# Patient Record
(demographics unavailable — no encounter records)

---

## 2017-03-22 NOTE — ER DOCUMENT REPORT
ED Medical Screen (RME)





- General


Stated Complaint: STOMACH PAIN


Notes: 


22 yo male c/o fever, headache x 1 day.  epigastric pain last pm, vomited x 2.  





no chronic illness


TRAVEL OUTSIDE OF THE [place] IN LAST 30 DAYS: No





- Related Data


Allergies/Adverse Reactions: 


 





No Known Allergies Allergy (Unverified 05/10/14 11:30)


 











Past Medical History


Pulmonary Medical History: Reports: Hx Asthma





- Immunizations


Hx Diphtheria, Pertussis, Tetanus Vaccination: Yes





Physical Exam





- Vital signs


Vitals: 





 











Temp Pulse Resp BP Pulse Ox


 


 101.1 F H  126 H  18   134/71 H  100 


 


 03/22/17 17:12  03/22/17 17:12  03/22/17 17:12  03/22/17 17:12  03/22/17 17:12














Course





- Vital Signs


Vital signs: 





 











Temp Pulse Resp BP Pulse Ox


 


 101.1 F H  126 H  18   134/71 H  100 


 


 03/22/17 17:12  03/22/17 17:12  03/22/17 17:12  03/22/17 17:12  03/22/17 17:12

## 2017-03-22 NOTE — ER DOCUMENT REPORT
ED General





- General


Chief Complaint: Headache


Stated Complaint: STOMACH PAIN


Time seen by provider: 20:35


Notes: 


Patient is a 23-year-old male that comes emergency department for chief 

complaint of vomiting that began last night and a fever that developed today.  

He states that he was having some upper abdominal pain after vomiting currently 

denies any symptoms.  He states he had a headache but this resolved with 

Tylenol.  He denies shortness of breath, chest pain, flank pain. He denies any 

current symptoms.  He denies any surgeries, daily medications, or any other 

medical history.





TRAVEL OUTSIDE OF THE [place] IN LAST 30 DAYS: No





- Related Data


Allergies/Adverse Reactions: 


 





No Known Allergies Allergy (Unverified 05/10/14 11:30)


 











Past Medical History





- General


Information source: Patient





- Social History


Smoking Status: Never Smoker


Frequency of alcohol use: None


Drug Abuse: None


Lives with: Family


Family History: Reviewed & Not Pertinent


Patient has suicidal ideation: No


Patient has homicidal ideation: No


Pulmonary Medical History: Reports: Hx Asthma


Renal/ Medical History: Denies: Hx Peritoneal Dialysis


Surgical Hx: Negative





- Immunizations


Hx Diphtheria, Pertussis, Tetanus Vaccination: Yes





Review of Systems





- Review of Systems


Constitutional: See HPI


EENT: No symptoms reported


Cardiovascular: No symptoms reported


Respiratory: No symptoms reported


Gastrointestinal: See HPI


Genitourinary: No symptoms reported


Male Genitourinary: No symptoms reported


Musculoskeletal: No symptoms reported


Skin: No symptoms reported


Hematologic/Lymphatic: No symptoms reported


Neurological/Psychological: See HPI





Physical Exam





- Vital signs


Vitals: 


 











Temp Pulse Resp BP Pulse Ox


 


 101.1 F H  126 H  18   134/71 H  100 


 


 03/22/17 17:12  03/22/17 17:12  03/22/17 17:12  03/22/17 17:12  03/22/17 17:12











Interpretation: Normal





- General


General appearance: Appears well, Alert


In distress: None





- HEENT


Head: Normocephalic, Atraumatic


Eyes: Normal


Conjunctiva: Normal


Extraocular movements intact: Yes


Eyelashes: Normal


Pupils: PERRL


Ears: Normal


External canal: Normal


Tympanic membrane: Normal


Sinus: Normal


Nasal: Normal


Mouth/Lips: Normal


Mucous membranes: Normal - Not significantly dry


Pharynx: Normal


Neck: Normal.  No: Anterior cervical chain, Posterior cervical chain, 

Meningismus





- Respiratory


Respiratory status: No respiratory distress


Chest status: Nontender


Breath sounds: Normal


Chest palpation: Normal





- Cardiovascular


Rhythm: Regular.  No: Tachycardia


Heart sounds: Normal auscultation, S1 appreciated, S2 appreciated


Murmur: No





- Abdominal


Inspection: Normal


Distension: No distension


Bowel sounds: Normal


Tenderness: Nontender


Organomegaly: No organomegaly





- Back


Back: Normal, Nontender





- Extremities


General upper extremity: Normal inspection, Nontender, Normal color, Normal ROM

, Normal temperature


General lower extremity: Normal inspection, Nontender, Normal color, Normal ROM

, Normal temperature, Normal weight bearing.  No: Juvencio's sign





- Neurological


Neuro grossly intact: Yes


Cognition: Normal


Orientation: AAOx4


[place] Coma Scale Eye Opening: Spontaneous


Red Coma Scale Verbal: Oriented


[place] Coma Scale Motor: Obeys Commands


Red Coma Scale Total: 15


Speech: Normal


Motor strength normal: LUE, RUE, LLE, RLE


Sensory: Normal





- Psychological


Associated symptoms: Normal affect, Normal mood





- Skin


Skin Temperature: Warm


Skin Moisture: Dry


Skin Color: Normal





Course





- Re-evaluation


Re-evalutation: 


Patient is very well-appearing.  Fever and tachycardia resolved after 

treatments from triage.  Completely soft and benign abdomen, no nuchal rigidity

, clear lungs, ENT exam is unremarkable.  Mild leukocytosis, suspect this was 

from vomiting, dehydration on workup but patient is tolerating fluids by mouth 

without any difficulty.  Providing the nausea medication, discussed primary 

care follow-up, discussed return precautions, patient states understanding and 

agreement





- Vital Signs


Vital signs: 


 











Temp Pulse Resp BP Pulse Ox


 


 98.6 F   97   18   119/61   96 


 


 03/22/17 20:56  03/22/17 20:56  03/22/17 20:56  03/22/17 20:56  03/22/17 20:56














- Laboratory


Result Diagrams: 


 03/22/17 17:25





 03/22/17 17:25


Laboratory results interpreted by me: 


 











  03/22/17 03/22/17 03/22/17





  17:25 17:25 17:25


 


WBC  11.8 H  


 


Seg Neutrophils %  87.5 H  


 


Lymphocytes %  6.7 L  


 


Absolute Neutrophils  10.3 H  


 


BUN   21 H 


 


Urine Protein    30 H














Discharge





- Discharge


Clinical Impression: 


Nausea and vomiting


Qualifiers:


 Vomiting type: unspecified Vomiting Intractability: non-intractable Qualified 

Code(s): R11.2 - Nausea with vomiting, unspecified





Fever


Qualifiers:


 Fever type: unspecified Qualified Code(s): R50.9 - Fever, unspecified





Condition: Stable


Disposition: HOME, SELF-CARE


Additional Instructions: 


Symptoms, examination, and workup are most consistent with a viral syndrome.  

This should resolve on its own.


Take Tylenol or ibuprofen for fever, drink plenty of fluids, take Zofran for 

nausea, rest.


Follow up with primary care.


Return to emergency department for any concerning worsening symptoms including 

uncontrolled vomiting, severe abdominal pain, etc.


Prescriptions: 


Ondansetron [Zofran Odt 4 mg Tablet] 1 - 2 tab PO Q4H PRN #15 tab.rapdis


 PRN Reason: For Nausea/Vomiting


Forms:  Return to Work

## 2018-01-02 NOTE — RADIOLOGY REPORT (SQ)
EXAM DESCRIPTION:  CHEST PA/LAT



COMPLETED DATE/TIME:  1/2/2018 3:42 pm



REASON FOR STUDY:  cough, congestion



COMPARISON:  None.



EXAM PARAMETERS:  NUMBER OF VIEWS: two views

TECHNIQUE: Digital Frontal and Lateral radiographic views of the chest acquired.

RADIATION DOSE: NA

LIMITATIONS: none



FINDINGS:  LUNGS AND PLEURA: No opacities, masses or pneumothorax. No pleural effusion.

MEDIASTINUM AND HILAR STRUCTURES: No masses or contour abnormalities.

HEART AND VASCULAR STRUCTURES: Heart normal size.  No evidence for failure.

BONES: No acute findings.

HARDWARE: None in the chest.

OTHER: No other significant finding.



IMPRESSION:  NO SIGNIFICANT RADIOGRAPHIC FINDING IN THE CHEST.



TECHNICAL DOCUMENTATION:  JOB ID:  [id number]

 2011 [employer]- All Rights Reserved

## 2018-01-24 NOTE — ER DOCUMENT REPORT
ED General





- General


Chief Complaint: Flu Symptoms


Stated Complaint: FLLU LIKE SYMPTOMS


Time Seen by Provider: 01/24/18 03:06


Notes: 





Patient is a 24-year-old male who presents with complaint of flulike symptoms.  

Patient says that he was seen here approximately 2 weeks ago.  At that time he 

was diagnosed strep throat.  He was given a shot of penicillin.  After they 

said he felt good for several days but then started having cough and congestion 

again.  Says he does feel like he has a lot of postnasal drip-like congestion.  

He has not been taking anything over-the-counter.  He has had T-max at home of 

100.  No vomiting.  No abdominal pain.  No other complaints at this time.  He 

has no chronic medical problems and is otherwise healthy.


TRAVEL OUTSIDE OF THE [place] IN LAST 30 DAYS: No





- Related Data


Allergies/Adverse Reactions: 


 





No Known Allergies Allergy (Verified 01/02/18 14:12)


 











Past Medical History





- Social History


Smoking Status: Never Smoker


Chew tobacco use (# tins/day): No


Frequency of alcohol use: None


Drug Abuse: None


Family History: Reviewed & Not Pertinent


Patient has suicidal ideation: No


Patient has homicidal ideation: No


Pulmonary Medical History: Reports: Hx Asthma


Renal/ Medical History: Denies: Hx Peritoneal Dialysis





- Immunizations


Hx Diphtheria, Pertussis, Tetanus Vaccination: Yes





Review of Systems





- Review of Systems


Notes: 





My Normal Review Basic





REVIEW OF SYSTEMS:


CONSTITUTIONAL : T-max 100.


EENT:   Congestion.


RESPIRATORY: Cough.


GASTROINTESTINAL:  Denies abdominal pain.  Denies nausea, vomiting, or diarrhea.


MUSCULOSKELETAL:  Denies neck or back pain or joint pain or swelling.


SKIN:   Denies rash or skin lesions.


NEUROLOGICAL:  Denies altered mental status or loss of consciousness.  


ALL OTHER SYSTEMS REVIEWED AND NEGATIVE.





Physical Exam





- Vital signs


Vitals: 


 











Temp Pulse Resp BP Pulse Ox


 


 98.4 F   81   14   119/69   96 


 


 01/24/18 01:22  01/24/18 01:22  01/24/18 01:22  01/24/18 01:22  01/24/18 01:22














- Notes


Notes: 





General Appearance: Well nourished, alert, cooperative, no acute distress, no 

obvious discomfort.  Well-appearing.  Audible nasal congestion on exam.


Vitals: reviewed, See vital signs table.


Head: no swelling or tenderness to the head


Eyes: PERRL, EOMI, Conjuctiva clear


Mouth: No decreasd moisture


Throat: No tonsillar inflammation, No airway obstruction,  No lymphadenopathy


Ears: Normal-appearing tympanic membranes bilaterally.


Neck: Supple, no neck tenderness, No thyromegaly


Lungs: No wheezing, No rales, No rhonci, No accessory muscle use, good air 

exchange bilaterally.


Heart: Normal rate, Regular rythm, No murmur, no rub


Abdomen: Normal BS, soft, No rigidity, No abdominal tenderness, No guarding, no 

rebound, no abdominal masses, no organomegaly


Extremities: strength 5/5 in all extremities, good pulses in all extremities, 

no swelling or tenderness in the extremities, no edema.


Skin: warm, dry, appropriate color, no rash


Neuro: speech clear, oriented x 3, normal affect, responds appropriately to 

questions.





Course





- Re-evaluation


Re-evalutation: 





01/24/18 03:56


Chest x-ray obtained because of recurrent coughing and fever at home.  Chest x-

ray is negative.  Patient clinically looks very well.  He has a large amount 

nasal congestion and postnasal drip on exam.  I did give him a shot of 

dexamethasone hoping this might help reduce some inflammation of the 

nasopharynx and reduce his congestion.  Also encouraged him to take over-the-

counter Sudafed.  I do not see a bacterial source of his infection at this 

time.  I suspect most likely has a viral illness.  I encouraged him follow-up 

with a physician in 2-3 days for reevaluation.  I encourage him return to ER if 

he has high fevers, difficulty breathing, vomiting, or feels unwell.  Patient 

agrees with plan will be discharged home.





Dictation of this chart was performed using voice recognition software; 

therefore, there may be some unintended grammatical errors.





- Vital Signs


Vital signs: 


 











Temp Pulse Resp BP Pulse Ox


 


 98.4 F   81   14   119/69   96 


 


 01/24/18 01:22  01/24/18 01:22  01/24/18 01:22  01/24/18 01:22  01/24/18 01:22














Discharge





- Discharge


Clinical Impression: 


 Cough





URI (upper respiratory infection)


Qualifiers:


 URI type: unspecified URI Qualified Code(s): J06.9 - Acute upper respiratory 

infection, unspecified





Condition: Good


Disposition: HOME, SELF-CARE


Additional Instructions: 


I suspect that your ongoing cough is related to the significant amount of 

postnasal drip or mucus that goes down the back of your throat.  I have given 

you a shot of medication called dexamethasone which helps reduce inflammation 

in your nasal pharynx and around the throat.  This should help your symptoms.  

Also please take over-the-counter Sudafed.  This will help reduce some of the 

congestion you have and should help you feel better quicker.  Your chest x-ray 

today was negative.  There is no evidence of pneumonia.  Please follow-up with 

your doctor in 2-3 days for reevaluation.  Please return to the ER immediately 

if you have recurrent fevers over 101, vomiting, difficulty breathing, or feel 

that you are worsening.

## 2018-01-24 NOTE — RADIOLOGY REPORT (SQ)
EXAM DESCRIPTION: CHEST PA/LAT



CLINICAL HISTORY: cough fever



COMPARISON: 01/02/2018



FINDINGS: Frontal and lateral views of the chest.  The

cardiomediastinal silhouette has normal size and contour. No

consolidation, pneumothorax, or pleural effusion.  No displaced

rib fractures identified.  Upper abdominal soft tissues are

unremarkable.



IMPRESSION:



1. No acute pulmonary process identified.

## 2018-03-06 NOTE — EKG REPORT
SEVERITY:- ABNORMAL ECG -

SINUS RHYTHM

INCOMPLETE RIGHT BUNDLE BRANCH BLOCK

:

Confirmed by: Prabhakar Foley 06-Mar-2018 09:09:03

## 2018-03-07 NOTE — EKG REPORT
SEVERITY:- ABNORMAL ECG -

SINUS RHYTHM

NONSPECIFIC INTRAVENTRICULAR CONDUCTION DELAY

:

Confirmed by: Prabhakar Foley 07-Mar-2018 08:53:42

## 2018-03-07 NOTE — EKG REPORT
SEVERITY:- ABNORMAL ECG -

SINUS TACHYCARDIA

NONSPECIFIC INTRAVENTRICULAR CONDUCTION DELAY

:

Confirmed by: Prabhakar Foley 07-Mar-2018 08:53:25

## 2018-03-10 NOTE — RADIOLOGY REPORT (SQ)
EXAM DESCRIPTION:  CHEST SINGLE VIEW



COMPLETED DATE/TIME:  3/10/2018 9:38 pm



REASON FOR STUDY:  chest pain



COMPARISON:  1/24/2018



EXAM PARAMETERS:  NUMBER OF VIEWS: One view.

TECHNIQUE: Single frontal radiographic view of the chest acquired.

RADIATION DOSE: NA

LIMITATIONS: None.



FINDINGS:  LUNGS AND PLEURA: No opacities, masses or pneumothorax. No pleural effusion.

MEDIASTINUM AND HILAR STRUCTURES: No masses.  Contour normal.

HEART AND VASCULAR STRUCTURES: Heart normal in size.  Normal vasculature.

BONES: No acute findings.

HARDWARE: None in the chest.

OTHER: No other significant finding.



IMPRESSION:  NO ACUTE RADIOGRAPHIC FINDING IN THE CHEST.



TECHNICAL DOCUMENTATION:  JOB ID:  [id number]

 2011 [employer]- All Rights Reserved



Reading location - IP/workstation name: [email or web address]

## 2018-03-10 NOTE — ER DOCUMENT REPORT
ED General





- General


Chief Complaint: Palpitations


Stated Complaint: PALPITATIONS


Time Seen by Provider: 03/10/18 20:55


Notes: 


Patient is a 24-year-old male comes emergency department for chief complaint of 

episodes where he will feel like his heart is beating irregularly and rapidly, 

he states that during the episodes of feel short of breath, feel a discomfort 

in his chest, he states that earlier before coming to the ER he felt like he 

was going to pass out.  He has not had a syncopal episode.  He states that 

symptoms have been going on intermittently for 1 week now, he states he was 

seen for this a couple of days ago and told to stop taking any supplements, he 

has not taken any supplements for a few days.  He states he had been taking 

some SARMS.  He denies lower extremity swelling, recent travel or surgery, he 

denies any daily medication or any medical history.  No family history of blood 

clot or early cardiac disease.  He denies nausea or vomiting, fever or chills, 

cough, or any other symptoms than the ones reported.  He denies recreational 

drugs, smoking, alcohol.


TRAVEL OUTSIDE OF THE [place] IN LAST 30 DAYS: No





- Related Data


Allergies/Adverse Reactions: 


 





No Known Allergies Allergy (Verified 01/02/18 14:12)


 











Past Medical History





- General


Information source: Patient





- Social History


Smoking Status: Never Smoker


Frequency of alcohol use: None


Drug Abuse: None


Lives with: Spouse/Significant other


Family History: Reviewed & Not Pertinent


Pulmonary Medical History: Reports: Hx Asthma


Renal/ Medical History: Denies: Hx Peritoneal Dialysis


Surgical Hx: Negative





- Immunizations


Immunizations up to date: Yes


Hx Diphtheria, Pertussis, Tetanus Vaccination: Yes





Review of Systems





- Review of Systems


Constitutional: No symptoms reported


EENT: No symptoms reported


Cardiovascular: See HPI


Respiratory: No symptoms reported


Gastrointestinal: No symptoms reported


Genitourinary: No symptoms reported


Male Genitourinary: No symptoms reported


Musculoskeletal: No symptoms reported


Skin: No symptoms reported


Hematologic/Lymphatic: No symptoms reported


Neurological/Psychological: See HPI





Physical Exam





- Vital signs


Vitals: 


 











Temp Pulse Resp BP Pulse Ox


 


 97.8 F   89   18   136/72 H  100 


 


 03/10/18 20:51  03/10/18 20:51  03/10/18 20:51  03/10/18 20:51  03/10/18 20:51














- General


General appearance: Anxious


In distress: None





- HEENT


Head: Normocephalic, Atraumatic


Eyes: Normal


Conjunctiva: Normal


Extraocular movements intact: Yes


Eyelashes: Normal


Pupils: PERRL - Slightly dilated pupils bilaterally


Nasal: Normal


Mouth/Lips: Normal


Mucous membranes: Normal


Pharynx: Normal


Neck: Normal





- Respiratory


Respiratory status: No respiratory distress.  No: Labored, Tachypnea


Breath sounds: Normal.  No: Decreased air movement, Wheezing





- Cardiovascular


Rhythm: Regular.  No: Tachycardia


Heart sounds: Normal auscultation, S1 appreciated, S2 appreciated


Murmur: No


Normal capillary refill: Yes





- Abdominal


Inspection: Normal


Tenderness: Nontender.  No: Tender, Guarding





- Extremities


General upper extremity: Normal inspection, Nontender, Normal strength, Normal 

temperature


General lower extremity: Normal inspection, Nontender, Normal strength, Normal 

temperature.  No: Edema





- Neurological


Neuro grossly intact: Yes


Cognition: Normal


Orientation: AAOx4


Red Coma Scale Eye Opening: Spontaneous


Red Coma Scale Verbal: Oriented


Red Coma Scale Motor: Obeys Commands


Red Coma Scale Total: 15


Speech: Normal


Cranial nerves: Normal


Cerebellar coordination: Normal


Motor strength normal: LUE, RUE, LLE, RLE


Additional motor exam normals: Equal 


Sensory: Normal





- Psychological


Associated symptoms: Anxious





- Skin


Skin Temperature: Warm


Skin Moisture: Dry


Skin Color: Normal





Course





- Re-evaluation


Re-evalutation: 


EKG shows sinus rhythm with no T-wave inversions or ST segment changes in 

consecutive leads.  Right bundle branch block.  Normal NH interval.





Chest x-ray unremarkable.  CBC, chemistry, urinalysis, urine drug screen, d-

dimer, troponin are all negative and unremarkable.  Very low suspicion of ACS, 

pulmonary embolism.  Placed patient on monitoring, patient began to complain 

that he was having the palpitations and discomfort again, I entered the room, 

patient was mildly tachycardic, pupils were dilated, flushed, and appeared to 

be very anxious with a panic attack.  However the monitor remains sinus rhythm.

  He had no PVCs or arrhythmias noted with the entire duration of his 

monitoring.  Patient given 0.5 mg of Ativan, symptoms subsided completely.





I did review that 2 days ago patient had a thyroid panel performed, TSH is very 

low, T3 mildly elevated.  Suggestive of hyperthyroid component, however patient 

is now asymptomatic with normal vital signs, had normal presenting vital signs, 

and thyroid panel was not overly concerning.  I provided patient with copy of 

this, he will follow-up with primary care for this, I did discuss anxiety, 

panic attacks, and his workup in total.  I did provide patient with small 

amount of Ativan only to take in the event of panic attacks, he will have 

additional management by primary care for this as well.  Patient states 

understanding and agreement with plan.





- Vital Signs


Vital signs: 


 











Temp Pulse Resp BP Pulse Ox


 


 99.8 F   89   20   121/64   98 


 


 03/10/18 23:55  03/10/18 20:51  03/10/18 23:55  03/10/18 23:55  03/10/18 23:55














- Laboratory


Result Diagrams: 


 03/10/18 21:35





 03/10/18 21:35


Laboratory results interpreted by me: 


 











  03/10/18 03/10/18





  21:35 21:35


 


Monocytes %  13.4 H 


 


BUN   23 H














Discharge





- Discharge


Clinical Impression: 


 Tachycardia, Anxiety





Condition: Stable


Disposition: HOME, SELF-CARE


Additional Instructions: 


It is not completely clear if your symptoms were from the supplements, this 

seems less likely now because of the duration since he took them, however your 

thyroid panel was not normal and has a hyperthyroid shift.  This can cause a 

variety of symptoms.  Please take your thyroid results and follow-up with 

primary care to have this workup and management completed.  Avoid caffeine or 

other stimulants.





Tonight you appeared to have a panic attack.  See additional details on this 

below.  In the event of a panic attack, take the medication prescribed only if 

needed, do not take otherwise.  Do not drive, mix with alcohol, mixed with 

other sedating medication.  Follow-up with primary care in regards to this as 

well.


_____________________________





Panic Attack





     The cause of panic attacks is unknown.  Symptoms can include chest pain, 

shortness of breath, palpitations, sweats, and a sense of smothering or 

impending doom.  In time, the panic attacks can lead to generalized anxiety and 

phobias.


     Because the symptoms can mimic heart attack, pulmonary embolism, and other 

serious diseases, the physician has evaluated you for these conditions.  There 

is no evidence of a serious problem.


     An acute panic attack usually goes away by itself without treatment.  A 

severe attack can be treated with medicine to calm you. Long-term, 

antidepressant medicines may help prevent attacks. Counselling can also be very 

beneficial in dealing with panic attacks. Panic attacks are less likely if you 

are getting regular exercise, proper diet, and plenty of sleep.


     It's normal for panic attacks to cause many frightening symptoms. However, 

you should call or return if your symptoms change significantly or if you are 

worsening.


Prescriptions: 


Lorazepam [Ativan 1 mg Tablet] 1 mg PO Q4 PRN #10 tab


 PRN Reason:

## 2018-03-11 NOTE — EKG REPORT
SEVERITY:- ABNORMAL ECG -

SINUS TACHYCARDIA

RIGHT BUNDLE BRANCH BLOCK

:

Confirmed by: Prabhakar Foley 11-Mar-2018 10:40:40

## 2025-04-12 NOTE — ER DOCUMENT REPORT
Aneta Hdez is collaborating physician      ___________________________________________________  SUBJECTIVE:  Elvin is a 24 ED General





- General


Chief Complaint: Palpitations


Stated Complaint: PALPITATIONS


Time Seen by Provider: 03/06/18 23:23


Notes: 





Patient is a 24-year-old male without past medical history who states he has 

been taking multiple supplements that he has purchased online for "gaining mass

".  Patient states he took the first dose yesterday morning and has been 

feeling very unwell since that time.  He describes symptoms of palpitations, 

feeling flushed, overheated and extremely anxious.  He also notes that he is 

felt somewhat aggressive and combative.  He denies any history of similar 

symptoms at baseline.  Nothing seems to improve or worsen his symptoms.  He has 

not seen his primary care doctor regarding today's concerns.  He has no 

psychiatric history.  He denies any additional drug or alcohol use.


TRAVEL OUTSIDE OF THE [place] IN LAST 30 DAYS: No





- Related Data


Allergies/Adverse Reactions: 


 





No Known Allergies Allergy (Verified 01/02/18 14:12)


 











Past Medical History





- General


Information source: Patient





- Social History


Smoking Status: Never Smoker


Frequency of alcohol use: Occasional


Drug Abuse: None


Lives with: Alone


Family History: Reviewed & Not Pertinent


Pulmonary Medical History: Reports: Hx Asthma


Renal/ Medical History: Denies: Hx Peritoneal Dialysis





- Immunizations


Hx Diphtheria, Pertussis, Tetanus Vaccination: Yes





Review of Systems





- Review of Systems


Notes: 





Constitutional: Negative for fever.


HENT: Negative for sore throat.


Eyes: Negative for visual changes.


Cardiovascular: Positive for palpitations and chest discomfort


Respiratory: Negative for shortness of breath.


Gastrointestinal: Negative for abdominal pain, vomiting or diarrhea.


Genitourinary: Negative for dysuria.


Musculoskeletal: Negative for back pain.


Skin: Negative for rash.


Neurological: Negative for headaches, weakness or numbness.





10 point ROS negative except as marked above and in HPI.





Physical Exam





- Vital signs


Vitals: 


 











Temp Pulse Resp BP Pulse Ox


 


 97.8 F   79   16   143/77 H  100 


 


 03/06/18 23:08  03/06/18 23:08  03/06/18 23:08  03/06/18 23:08  03/06/18 23:08











Interpretation: Hypertensive


Notes: 





PHYSICAL EXAMINATION:





GENERAL: Appears anxious, in no acute distress





HEAD: Atraumatic, normocephalic.





EYES: Pupils equal round and reactive to light, extraocular movements intact, 

sclera anicteric, conjunctiva are normal.





ENT: nares patent, oropharynx clear without exudates.  Moderately dry mucous 

membranes.





NECK: Normal range of motion, supple without lymphadenopathy





LUNGS: Breath sounds clear to auscultation bilaterally and equal.  No wheezes 

rales or rhonchi.





HEART: Regular rate and rhythm without murmurs





ABDOMEN: Soft, nontender, normoactive bowel sounds.  No guarding, no rebound.  

No masses appreciated.





EXTREMITIES: Normal range of motion, no pitting or edema.  No cyanosis.





NEUROLOGICAL: No focal neurological deficits. Moves all extremities 

spontaneously and on command.





PSYCH: Somewhat anxious, fidgety





SKIN: Warm, Dry, normal turgor, no rashes or lesions noted.





Course





- Re-evaluation


Re-evalutation: 





03/06/18 23:36


Patient presents with palpitations but is in no acute distress.  Vitals within 

normal limits at time of arrival.  EKG unremarkable with a normal sinus rhythm.

  Laboratories are unremarkable.  Patient denies any chest pain, shortness of 

breath, or vomiting.  At this time based on exam and history do not suspect a 

new onset arrhythmia, ACS, acute pulmonary embolus, aortic dissection.  Patient 

symptoms are likely secondary a supplement that he has recently started taking 

as his symptoms started within several hours of taking that supplement and have 

persistent since that time.  Although TSH is noted to be markedly low, the 

patient's free T4 is normal, free T3 is only mildly elevated.  I have 

instructed the patient to follow-up on these labs.  At this time will discharge 

with return precautions and follow-up recommendations.  Verbal discharge 

instructions given a the bedside and opportunity for questions given. 

Medication warnings reviewed. Patient is in agreement with this plan and has 

verbalized understanding of return precautions and the need for primary care 

follow-up in the next 24-72 hours.











- Vital Signs


Vital signs: 


 











Temp Pulse Resp BP Pulse Ox


 


 97.8 F   79   13   129/77 H  99 


 


 03/06/18 23:08  03/06/18 23:08  03/07/18 02:47  03/07/18 02:49  03/07/18 02:47














- Laboratory


Result Diagrams: 


 03/06/18 23:25





 03/06/18 23:25


Laboratory results interpreted by me: 


 











  03/06/18 03/06/18 03/06/18





  23:25 23:25 23:25


 


WBC  10.8 H  


 


Glucose   111 H 


 


TSH    < 0.01 L


 


Free T3 pg/mL    7.39 H














- EKG Interpretation by Me


Additional EKG results interpreted by me: 





03/06/18 23:37


Sinus rhythm.  Rate 76.  No ST elevations or depressions.  QTC is 446.





Discharge





- Discharge


Clinical Impression: 


 Palpitations





Medication reaction


Qualifiers:


 Encounter type: initial encounter Qualified Code(s): T88.7XXA - Unspecified 

adverse effect of drug or medicament, initial encounter





Condition: Good


Disposition: HOME, SELF-CARE


Additional Instructions: 


Stop taking the supplements that triggered her symptoms today.  Return if you 

develop chest pain, shortness of breath, pass out, or have any other symptoms 

that are worrisome to you. 


Forms:  Return to Work 